# Patient Record
(demographics unavailable — no encounter records)

---

## 2024-12-12 NOTE — HISTORY OF PRESENT ILLNESS
[FreeTextEntry1] : Annual [de-identified] : Ms. BEBETO CORONA is a 36 year old female presenting for an annual. Patient states she could not go for his surgery last year she had to reschedule it to July 2025 because she was getting his citizenship paperwork sorted out.  She wants to come back in June to get repeat blood work to ensure she does not have anemia.  The plastic surgeon in  wants her to do blood work and Kim prior to surgery in July. Hyperlipidemia: wants to check labs Seen by GYN DR Rico 9/2024 2023: States she is scheduling for lipo with tummy tuck in  and wants Hb above 12.5. She had a 9 lb baby 2 years ago and states since then she has not been able to lose weight around the middle. Seen by Allergist Dr Espitia and ENT Dr Santiago states she takes Montelukast and fluticasone for pollen and environmental allergies. s/p salpingectomy 7/2023 s/p sinuplasty 2020.

## 2024-12-12 NOTE — PHYSICAL EXAM
[Normal Sclera/Conjunctiva] : normal sclera/conjunctiva [Normal Outer Ear/Nose] : the outer ears and nose were normal in appearance [Normal Oropharynx] : the oropharynx was normal [Normal Posterior Cervical Nodes] : no posterior cervical lymphadenopathy [Normal Anterior Cervical Nodes] : no anterior cervical lymphadenopathy [Normal] : normal gait, coordination grossly intact, no focal deficits and deep tendon reflexes were 2+ and symmetric [Normal Affect] : the affect was normal [Normal Insight/Judgement] : insight and judgment were intact [de-identified] : Discoloration and thickening of toenails.

## 2024-12-12 NOTE — ASSESSMENT
[FreeTextEntry1] : Annual SBIRT negative Depression screen negative 5 mins spent on screening Check comprehensive labs, in office today  Vaccines  Adacel 2021 Flu declines COVID booster declines  GYN 9/24  pap up to date   Patient states she could not go for his surgery last year she had to reschedule it to July 2025 because she was getting his citizenship paperwork sorted out.  She wants to come back in June to get repeat blood work to ensure she does not have anemia.  The plastic surgeon in  wants her to do blood work and Kim prior to surgery in July. Prev Hx 2023: States she is scheduling for lipo with tummy tuck in  and wants Hb above 12.5. She had a 9lb baby 2 years ago and states since then she has not been able to lose weight around the middle.  Enviornmental allergies Seen by Allergist Dr Espitia and ENT Dr Santiago states she takes Montelukast and fluticasone for pollen and environmental allergies. s/p sinuplasty 2020.  HDL Cholesterol is slightly high last labs. Follow low fat diet and regular exercise routine.  Avoid oils, red meat, cheeses and fried foods. Labs to be done today  Pre op 6/2025 Follow up annually.

## 2024-12-12 NOTE — HEALTH RISK ASSESSMENT
[Good] : ~his/her~  mood as  good [No] : In the past 12 months have you used drugs other than those required for medical reasons? No [Little interest or pleasure doing things] : 1) Little interest or pleasure doing things [Feeling down, depressed, or hopeless] : 2) Feeling down, depressed, or hopeless [0] : 2) Feeling down, depressed, or hopeless: Not at all (0) [PHQ-2 Negative - No further assessment needed] : PHQ-2 Negative - No further assessment needed [Never] : Never [Patient reported PAP Smear was normal] : Patient reported PAP Smear was normal [HIV Test offered] : HIV Test offered [Hepatitis C test offered] : Hepatitis C test offered [Cultural] : cultural [With Family] : lives with family [Employed] : employed [] :  [Smoke Detector] : smoke detector [Carbon Monoxide Detector] : carbon monoxide detector [Seat Belt] :  uses seat belt [Sunscreen] : uses sunscreen [Reviewed no changes] : Reviewed, no changes [Name: ___] : Health Care Proxy's Name: [unfilled]  [Relationship: ___] : Relationship: [unfilled] [Audit-CScore] : 0 [[id number]] : 0 [Change in mental status noted] : No change in mental status noted [Reports changes in hearing] : Reports no changes in hearing [Reports changes in vision] : Reports no changes in vision [Reports changes in dental health] : Reports no changes in dental health [TB Exposure] : is not being exposed to tuberculosis [PapSmearDate] : 9/2024 [FreeTextEntry2] : with   for [employer]. [AdvancecareDate] : 12/2024

## 2025-06-12 NOTE — PLAN
[FreeTextEntry1] : Enviornmental allergies Seen by Allergist Dr Espitia and ENT Dr Santiago states she takes Montelukast and fluticasone for pollen and environmental allergies. s/p sinuplasty 2020.  HDL She is concerned and wants her cholesterol repeated.  She is Non fasting. Advised will repeat labs at annual in December. Cholesterol is slightly high  Follow low fat diet and regular exercise routine. Advised plant-based diet is better to lower cholesterol. Avoid oils, red meat, cheeses and fried foods.

## 2025-06-12 NOTE — ASSESSMENT
[As per surgery] : as per surgery [No Further Testing Recommended] : no further testing recommended [Continue medications as is] : Continue current medications [High Risk Surgery - Intraperitoneal, Intrathoracic or Supringuinal Vascular Procedures] : High Risk Surgery - Intraperitoneal, Intrathoracic or Supringuinal Vascular Procedures - No (0) [Ischemic Heart Disease] : Ischemic Heart Disease - No (0) [Congestive Heart Failure] : Congestive Heart Failure - No (0) [Prior Cerebrovascular Accident or TIA] : Prior Cerebrovascular Accident or TIA - No (0) [Creatinine >= 2mg/dL (1 Point)] : Creatinine >= 2mg/dL - No (0) [Insulin-dependent Diabetic (1 Point)] : Insulin-dependent Diabetic - No (0) [0] : 0 , RCRI Class: I, Risk of Post-Op Cardiac Complications: 3.9%, 95% CI for Risk Estimate: 2.8% - 5.4% [Patient Optimized for Surgery] : Patient optimized for surgery [FreeTextEntry4] : Ms. BEBETO CORONA is a 36 year old female presenting for Pre op evaluation. She is scheduled for panniculectomy with liposuction.  Labs and urine analysis okay, A1c is normal.   No concerns.

## 2025-06-12 NOTE — PHYSICAL EXAM
[Normal Sclera/Conjunctiva] : normal sclera/conjunctiva [Normal Outer Ear/Nose] : the outer ears and nose were normal in appearance [Normal Oropharynx] : the oropharynx was normal [Normal Posterior Cervical Nodes] : no posterior cervical lymphadenopathy [Normal Anterior Cervical Nodes] : no anterior cervical lymphadenopathy [Normal] : normal gait, coordination grossly intact, no focal deficits and deep tendon reflexes were 2+ and symmetric [Normal Affect] : the affect was normal [Normal Insight/Judgement] : insight and judgment were intact [de-identified] : Discoloration and thickening of toenails.

## 2025-06-12 NOTE — HISTORY OF PRESENT ILLNESS
[No Pertinent Cardiac History] : no history of aortic stenosis, atrial fibrillation, coronary artery disease, recent myocardial infarction, or implantable device/pacemaker [No Pertinent Pulmonary History] : no history of asthma, COPD, sleep apnea, or smoking [No Adverse Anesthesia Reaction] : no adverse anesthesia reaction in self or family member [(Patient denies any chest pain, claudication, dyspnea on exertion, orthopnea, palpitations or syncope)] : Patient denies any chest pain, claudication, dyspnea on exertion, orthopnea, palpitations or syncope [Chronic Anticoagulation] : no chronic anticoagulation [Diabetes] : no diabetes [Chronic Kidney Disease] : no chronic kidney disease [FreeTextEntry1] : Panniculectomy with Liposuction [FreeTextEntry2] : 7/10/2025 [FreeTextEntry3] : Dr Hair. [FreeTextEntry4] : Ms. BEBETO CORONA is a 36 year old female presenting for Pre op evaluation. Patient states that she is concerned about her cholesterol.  She states she is trying to make some lifestyle changes since she found out her cholesterol was high. Patient states she could not go for her surgery last year she had to reschedule it to July 2025 because she was getting her citizenship paperwork sorted out. She is concerned, wants repeat blood work to ensure she does not have anemia.  Cycles are regular.  Occasionally she has heavy menses.  The plastic surgeon in  wants her to do blood work and Kim prior to surgery in July. Hyperlipidemia: wants to check labs Seen by GYN DR Rico 9/2024 2023: States she is scheduling for lipo with tummy tuck in  and wants Hb above 12.5. She had a 9 lb baby 2 years ago and states since then she has not been able to lose weight around the middle. Seen by Allergist Dr Espitia and ENT Dr Santiago states she takes Montelukast and fluticasone for pollen and environmental allergies. s/p salpingectomy 7/2023 s/p sinuplasty 2020.

## 2025-06-12 NOTE — PHYSICAL EXAM
[Normal Sclera/Conjunctiva] : normal sclera/conjunctiva [Normal Outer Ear/Nose] : the outer ears and nose were normal in appearance [Normal Oropharynx] : the oropharynx was normal [Normal Posterior Cervical Nodes] : no posterior cervical lymphadenopathy [Normal Anterior Cervical Nodes] : no anterior cervical lymphadenopathy [Normal] : normal gait, coordination grossly intact, no focal deficits and deep tendon reflexes were 2+ and symmetric [Normal Affect] : the affect was normal [Normal Insight/Judgement] : insight and judgment were intact [de-identified] : Discoloration and thickening of toenails.

## 2025-06-12 NOTE — HISTORY OF PRESENT ILLNESS
[No Pertinent Cardiac History] : no history of aortic stenosis, atrial fibrillation, coronary artery disease, recent myocardial infarction, or implantable device/pacemaker [No Adverse Anesthesia Reaction] : no adverse anesthesia reaction in self or family member [No Pertinent Pulmonary History] : no history of asthma, COPD, sleep apnea, or smoking [(Patient denies any chest pain, claudication, dyspnea on exertion, orthopnea, palpitations or syncope)] : Patient denies any chest pain, claudication, dyspnea on exertion, orthopnea, palpitations or syncope [Chronic Anticoagulation] : no chronic anticoagulation [Diabetes] : no diabetes [Chronic Kidney Disease] : no chronic kidney disease [FreeTextEntry1] : Panniculectomy with Liposuction [FreeTextEntry2] : 7/10/2025 [FreeTextEntry3] : Dr Hair. [FreeTextEntry4] : Ms. BEBETO CORONA is a 36 year old female presenting for Pre op evaluation. Patient states that she is concerned about her cholesterol.  She states she is trying to make some lifestyle changes since she found out her cholesterol was high. Patient states she could not go for her surgery last year she had to reschedule it to July 2025 because she was getting her citizenship paperwork sorted out. She is concerned, wants repeat blood work to ensure she does not have anemia.  Cycles are regular.  Occasionally she has heavy menses.  The plastic surgeon in  wants her to do blood work and Kim prior to surgery in July. Hyperlipidemia: wants to check labs Seen by GYN DR Rico 9/2024 2023: States she is scheduling for lipo with tummy tuck in  and wants Hb above 12.5. She had a 9 lb baby 2 years ago and states since then she has not been able to lose weight around the middle. Seen by Allergist Dr Espitia and ENT Dr Santiago states she takes Montelukast and fluticasone for pollen and environmental allergies. s/p salpingectomy 7/2023 s/p sinuplasty 2020.

## 2025-06-12 NOTE — ASSESSMENT
[As per surgery] : as per surgery [No Further Testing Recommended] : no further testing recommended [Continue medications as is] : Continue current medications [High Risk Surgery - Intraperitoneal, Intrathoracic or Supringuinal Vascular Procedures] : High Risk Surgery - Intraperitoneal, Intrathoracic or Supringuinal Vascular Procedures - No (0) [Congestive Heart Failure] : Congestive Heart Failure - No (0) [Ischemic Heart Disease] : Ischemic Heart Disease - No (0) [Prior Cerebrovascular Accident or TIA] : Prior Cerebrovascular Accident or TIA - No (0) [Creatinine >= 2mg/dL (1 Point)] : Creatinine >= 2mg/dL - No (0) [Insulin-dependent Diabetic (1 Point)] : Insulin-dependent Diabetic - No (0) [0] : 0 , RCRI Class: I, Risk of Post-Op Cardiac Complications: 3.9%, 95% CI for Risk Estimate: 2.8% - 5.4% [Patient Optimized for Surgery] : Patient optimized for surgery [FreeTextEntry4] : Ms. BEBETO CORONA is a 36 year old female presenting for Pre op evaluation. She is scheduled for panniculectomy with liposuction.  Labs and urine analysis okay, A1c is normal.   No concerns.